# Patient Record
Sex: MALE | Race: WHITE | NOT HISPANIC OR LATINO | Employment: FULL TIME | URBAN - METROPOLITAN AREA
[De-identification: names, ages, dates, MRNs, and addresses within clinical notes are randomized per-mention and may not be internally consistent; named-entity substitution may affect disease eponyms.]

---

## 2020-05-21 DIAGNOSIS — Z13.9 ENCOUNTER FOR SCREENING: Primary | ICD-10-CM

## 2020-05-24 ENCOUNTER — OFFICE VISIT (OUTPATIENT)
Dept: URGENT CARE | Facility: CLINIC | Age: 40
End: 2020-05-24
Payer: COMMERCIAL

## 2020-05-24 VITALS
HEART RATE: 97 BPM | DIASTOLIC BLOOD PRESSURE: 87 MMHG | OXYGEN SATURATION: 98 % | SYSTOLIC BLOOD PRESSURE: 135 MMHG | TEMPERATURE: 98 F

## 2020-05-24 DIAGNOSIS — M79.604 RIGHT LEG PAIN: Primary | ICD-10-CM

## 2020-05-24 PROCEDURE — 99214 OFFICE O/P EST MOD 30 MIN: CPT | Mod: S$GLB,,, | Performed by: NURSE PRACTITIONER

## 2020-05-24 PROCEDURE — 99214 PR OFFICE/OUTPT VISIT, EST, LEVL IV, 30-39 MIN: ICD-10-PCS | Mod: S$GLB,,, | Performed by: NURSE PRACTITIONER

## 2020-05-24 RX ORDER — MELOXICAM 15 MG/1
15 TABLET ORAL DAILY
Qty: 14 TABLET | Refills: 0 | Status: SHIPPED | OUTPATIENT
Start: 2020-05-24 | End: 2020-06-07

## 2020-05-24 RX ORDER — PREDNISONE 20 MG/1
20 TABLET ORAL DAILY
Qty: 3 TABLET | Refills: 0 | Status: SHIPPED | OUTPATIENT
Start: 2020-05-24 | End: 2020-05-27

## 2020-05-24 RX ORDER — CYCLOBENZAPRINE HCL 5 MG
5-10 TABLET ORAL 3 TIMES DAILY PRN
Qty: 20 TABLET | Refills: 0 | Status: SHIPPED | OUTPATIENT
Start: 2020-05-24 | End: 2020-05-31

## 2020-05-24 NOTE — PROGRESS NOTES
Subjective:       Patient ID: Tyson Burger is a 39 y.o. male.    Vitals:  temperature is 98.3 °F (36.8 °C). His blood pressure is 135/87 and his pulse is 97. His oxygen saturation is 98%.     Chief Complaint: Muscle Pain    Muscle Pain   This is a new problem. The current episode started in the past 7 days (3-4 days). The problem occurs constantly. The problem has been gradually worsening. Associated symptoms include myalgias. Pertinent negatives include no arthralgias, chest pain, chills, congestion, coughing, fatigue, fever, headaches, joint swelling, nausea, rash, sore throat, vertigo or vomiting. Nothing aggravates the symptoms. Treatments tried: ibuprofen. The treatment provided mild relief.       Constitution: Negative for chills, fatigue and fever.   HENT: Negative for congestion and sore throat.    Neck: Negative for painful lymph nodes.   Cardiovascular: Negative for chest pain and leg swelling.   Eyes: Negative for double vision and blurred vision.   Respiratory: Negative for cough and shortness of breath.    Gastrointestinal: Negative for nausea, vomiting and diarrhea.   Genitourinary: Negative for dysuria, frequency and urgency.   Musculoskeletal: Positive for pain, pain with walking, muscle cramps and muscle ache. Negative for joint pain and joint swelling.   Skin: Negative for color change, pale and rash.   Allergic/Immunologic: Negative for seasonal allergies.   Neurological: Negative for dizziness, history of vertigo, light-headedness, passing out and headaches.   Hematologic/Lymphatic: Negative for swollen lymph nodes, easy bruising/bleeding and history of blood clots. Does not bruise/bleed easily.   Psychiatric/Behavioral: Negative for nervous/anxious, sleep disturbance and depression. The patient is not nervous/anxious.        Objective:      Physical Exam Given the COVID-19 crisis, and under the direction of my medical director, I am choosing to forego a PE.      Assessment:       1. Right  leg pain        Plan:         Right leg pain  -     meloxicam (MOBIC) 15 MG tablet; Take 1 tablet (15 mg total) by mouth once daily. for 14 days  Dispense: 14 tablet; Refill: 0  -     cyclobenzaprine (FLEXERIL) 5 MG tablet; Take 1-2 tablets (5-10 mg total) by mouth 3 (three) times daily as needed.  Dispense: 20 tablet; Refill: 0  -     predniSONE (DELTASONE) 20 MG tablet; Take 1 tablet (20 mg total) by mouth once daily. for 3 days  Dispense: 3 tablet; Refill: 0  -     Ambulatory referral/consult to Orthopedics      Patient Instructions     Please follow up with orthopedics as instructed. If you have been given a referral, "[facility]" will call you to select an appointment date, time, location, and health care provider. If "[facility]" does not call you, please call [phone] to set up your appointment. If you have taken x-rays today and would like a CD of your images, one can be provided for you.    Please return here or go to the Emergency Department for any concerns or worsening of condition.  If you were given a splint wear it at all times unless otherwise instructed.     If you were given crutches use them as we instructed. Do not rest your armpits on the foam pad or you risk compressing the nerves and the vessels there.    If you have been given a referral to orthopedics, I will NOT release you from restrictions for work or school duties. Orthopedics must clear you for full use if you have been referred-this is to protect and preserve your full use/function of that extremity/joint in the future.    If you lose control of your bowel and/or bladder, please go to the nearest Emergency Department immediately.  If you lose sensation in between your legs by your genitalia and/or rectum, please go to the nearest Emergency Department immediately.  If you lose control or sensation of any extremity, please go to the nearest Emergency Department immediately.    R.I.C.E.T. to the affected joint or limb as needed:    REST   Rest- the injured or sore extremity, this includes the use of an immobilization device you may have been given in clinic.   ICE Ice- for the next 24-48 hours, alternating 20 minutes on and 20 minutes off as needed up to 3 times a day. Don't use ice packs on the left shoulder if you have a heart condition, and don't use ice packs around the front or side of the neck. Heat treatments should be used for chronic/older conditions to help relax and loosen tissues and to stimulate blood flow to the area. Use heat treatments for conditions such as overuse injuries before participating in activities.  When using heat treatments, be very careful to use a moderate heat for a limited time to avoid burns. Never leave heating pads or towels on for extended periods of time or while sleeping.   COMPRESSION Compression-Use an ACE wrap to provide compression to the injured extremity. Copper-infused compression garments are available over-the-counter at [employer], and other drug stores and may provide just the right amount of compression and reduce the likeliness of having to frequently adjust a bandage for comfort. Check circulation to make sure your bandage or compression device is not too tight.     ELEVATION Elevate-when possible to reduce swelling.     TOPICALS Topical relief: Tiger Balm may be used as directed on the label. Wash your hands before and after applying this medicine (do not get this medication in your eyes). For your first use, apply only to a small skin area to test how your skin reacts to the medicine. Tiger Balm contains camphor and menthol and this can cause a burning or cold sensation, which is usually mild and should lessen over time with continued use. If this sensation causes significant discomfort, wash the skin with soap and water.  Epsom salt: In water, Epsom salt breaks down into magnesium and sulfate. The theory is that when you soak in an Epsom salt bath, these get into your body through  your skin. That hasn't been proven, but just soaking in warm water can help relax muscles and loosen stiff joints.       Why didn't I get a steroid shot or a medrol dose pack?  The benefits are small and, unlike topical glucocorticoids, systemic glucocorticoids possess a significant side effect profile. Major side effects include:     Skin consequences: Skin thinning and ecchymoses, Cushingoid appearance (rounded face), acne, weight gain, mild hirsutism, facial erythema, and striae.   Eye consequences: Cataracts, increased intraocular pressure, exophthalmos.    Cardiovascular consequences: Fluid retention, premature atherosclerotic disease, and arrhythmias.    GI consequences: Increased risk for adverse gastrointestinal effects, such as gastritis, ulcer formation, and gastrointestinal bleeding   Bone and muscle consequences: Osteoporosis, osteonecrosis, and myopathy.   Neuropsychiatric effects: Mood disorders, psychosis, memory impairment, and    Metabolic and Endocrine consequences: Suppress the hypothalamic-pituitary-adrenal (HPA) axis and increase blood sugar.   Effects immunity predisposing you to getting a more severe infection and increases your white blood cell count.   Young children -- Growth impairment        You MAY have been given some of the following medications:    Narcotic pain medications and muscle relaxants: Muscle relaxants work by causing the muscles to become less tense or stiff, which in turn reduces pain and discomfort. Please be aware that narcotics and muscle relaxants can be addictive. If I gave you narcotics or relaxants, I have given you a limited quantity to take as it is needed at this time. However take it sparingly and only when needed. Do not operate machinery or drive on this medication.      Mobic is s known as a nonsteroidal anti-inflammatory drug (NSAID), it is used to treat inflammation. It reduces pain, swelling, and stiffness of the joints. Please do NOT take any  other NSAID medications while on this medication, you can take Tylenol if you feel the need. If you were not prescribed an anti-inflammatory medication, and if you do not have any history of stomach/intestinal ulcers, or kidney disease, or are not taking a blood thinner such as Coumadin, Plavix, Pradaxa, Eloquis, or Xaralta for example, it is OK to take over the counter Ibuprofen or Advil or Motrin or Aleve as directed.  Do not take any of these medications on an empty stomach.

## 2020-05-24 NOTE — LETTER
May 24, 2020      [facility]  [address], [address]  [place] [address]  Phone: [phone]  Fax: [phone]       Patient: Tyson Burger   YOB: 1980  Date of Visit: 05/24/2020    To Whom It May Concern:    Lisette Burger  was at [facility] on 05/24/2020. He may return to work/school on 5/26/20 with no restrictions. If you have any questions or concerns, or if I can be of further assistance, please do not hesitate to contact me.    Sincerely,    Meliza Mansfield, NP

## 2020-05-24 NOTE — PATIENT INSTRUCTIONS
Please follow up with orthopedics as instructed. If you have been given a referral, [facility] will call you to select an appointment date, time, location, and health care provider. If [facility] does not call you, please call [phone] to set up your appointment. If you have taken x-rays today and would like a CD of your images, one can be provided for you.    Please return here or go to the Emergency Department for any concerns or worsening of condition.  If you were given a splint wear it at all times unless otherwise instructed.     If you were given crutches use them as we instructed. Do not rest your armpits on the foam pad or you risk compressing the nerves and the vessels there.    If you have been given a referral to orthopedics, I will NOT release you from restrictions for work or school duties. Orthopedics must clear you for full use if you have been referred-this is to protect and preserve your full use/function of that extremity/joint in the future.    If you lose control of your bowel and/or bladder, please go to the nearest Emergency Department immediately.  If you lose sensation in between your legs by your genitalia and/or rectum, please go to the nearest Emergency Department immediately.  If you lose control or sensation of any extremity, please go to the nearest Emergency Department immediately.    R.I.C.E.T. to the affected joint or limb as needed:    REST  Rest- the injured or sore extremity, this includes the use of an immobilization device you may have been given in clinic.   ICE Ice- for the next 24-48 hours, alternating 20 minutes on and 20 minutes off as needed up to 3 times a day. Don't use ice packs on the left shoulder if you have a heart condition, and don't use ice packs around the front or side of the neck. Heat treatments should be used for chronic/older conditions to help relax and loosen tissues and to stimulate blood flow to the area. Use heat treatments for conditions such as  overuse injuries before participating in activities.  When using heat treatments, be very careful to use a moderate heat for a limited time to avoid burns. Never leave heating pads or towels on for extended periods of time or while sleeping.   COMPRESSION Compression-Use an ACE wrap to provide compression to the injured extremity. Copper-infused compression garments are available over-the-counter at [employer], and other drug stores and may provide just the right amount of compression and reduce the likeliness of having to frequently adjust a bandage for comfort. Check circulation to make sure your bandage or compression device is not too tight.     ELEVATION Elevate-when possible to reduce swelling.     TOPICALS Topical relief: Tiger Balm may be used as directed on the label. Wash your hands before and after applying this medicine (do not get this medication in your eyes). For your first use, apply only to a small skin area to test how your skin reacts to the medicine. Tiger Balm contains camphor and menthol and this can cause a burning or cold sensation, which is usually mild and should lessen over time with continued use. If this sensation causes significant discomfort, wash the skin with soap and water.  Epsom salt: In water, Epsom salt breaks down into magnesium and sulfate. The theory is that when you soak in an Epsom salt bath, these get into your body through your skin. That hasn't been proven, but just soaking in warm water can help relax muscles and loosen stiff joints.       Why didn't I get a steroid shot or a medrol dose pack?  The benefits are small and, unlike topical glucocorticoids, systemic glucocorticoids possess a significant side effect profile. Major side effects include:     Skin consequences: Skin thinning and ecchymoses, Cushingoid appearance (rounded face), acne, weight gain, mild hirsutism, facial erythema, and striae.   Eye consequences: Cataracts, increased intraocular  pressure, exophthalmos.    Cardiovascular consequences: Fluid retention, premature atherosclerotic disease, and arrhythmias.    GI consequences: Increased risk for adverse gastrointestinal effects, such as gastritis, ulcer formation, and gastrointestinal bleeding   Bone and muscle consequences: Osteoporosis, osteonecrosis, and myopathy.   Neuropsychiatric effects: Mood disorders, psychosis, memory impairment, and    Metabolic and Endocrine consequences: Suppress the hypothalamic-pituitary-adrenal (HPA) axis and increase blood sugar.   Effects immunity predisposing you to getting a more severe infection and increases your white blood cell count.   Young children -- Growth impairment        You MAY have been given some of the following medications:    Narcotic pain medications and muscle relaxants: Muscle relaxants work by causing the muscles to become less tense or stiff, which in turn reduces pain and discomfort. Please be aware that narcotics and muscle relaxants can be addictive. If I gave you narcotics or relaxants, I have given you a limited quantity to take as it is needed at this time. However take it sparingly and only when needed. Do not operate machinery or drive on this medication.      Mobic is s known as a nonsteroidal anti-inflammatory drug (NSAID), it is used to treat inflammation. It reduces pain, swelling, and stiffness of the joints. Please do NOT take any other NSAID medications while on this medication, you can take Tylenol if you feel the need. If you were not prescribed an anti-inflammatory medication, and if you do not have any history of stomach/intestinal ulcers, or kidney disease, or are not taking a blood thinner such as Coumadin, Plavix, Pradaxa, Eloquis, or Xaralta for example, it is OK to take over the counter Ibuprofen or Advil or Motrin or Aleve as directed.  Do not take any of these medications on an empty stomach.